# Patient Record
Sex: MALE | Race: WHITE | Employment: OTHER | URBAN - METROPOLITAN AREA
[De-identification: names, ages, dates, MRNs, and addresses within clinical notes are randomized per-mention and may not be internally consistent; named-entity substitution may affect disease eponyms.]

---

## 2019-08-12 NOTE — TELEPHONE ENCOUNTER
ONCOLOGY INTAKE: Records Information      APPT INFORMATION:  Referring provider:  Self([facility])  Referring provider s clinic:  NA  Reason for visit/diagnosis:  Non Hodgkin Lymphoma  Has patient been notified of appointment date and time?: Per PT    RECORDS INFORMATION:  Were the records received with the referral (via [employer])? No    Has patient been seen for any external appt for this diagnosis? Per PT, previous [facility], records in [facility].  PT also has a family practitioner at [facility]    ADDITIONAL INFORMATION:  NA

## 2019-08-13 NOTE — TELEPHONE ENCOUNTER
RECORDS STATUS - ALL OTHER DIAGNOSIS      RECORDS RECEIVED FROM:    DATE RECEIVED:    NOTES STATUS DETAILS   OFFICE NOTE from referring provider     OFFICE NOTE from medical oncologist     DISCHARGE SUMMARY from hospital     DISCHARGE REPORT from the ER     OPERATIVE REPORT     MEDICATION LIST     CLINICAL TRIAL TREATMENTS TO DATE     LABS     PATHOLOGY REPORTS [facility]/P 6/11/09, 5/21/09, 5/18/19 (Hematopathology-Path Consult from Rahel BENNETT 5/6/09), 5/14/19   ANYTHING RELATED TO DIAGNOSIS     GENONOMIC TESTING     TYPE:     IMAGING (NEED IMAGES & REPORT)     CT SCANS     MRI     MAMMO     ULTRASOUND     PET

## 2019-08-15 NOTE — TELEPHONE ENCOUNTER
Pt returned call. Pt confirmed no treatment since he was last seen by Dr. Barkley 5/12/16. Pt then stated that he would be cancelling the appointment as he had spoken with his GP and it was determined his symptoms were related to something else, and the appointment would not be needed.  3:33 PM

## 2019-09-05 ENCOUNTER — PRE VISIT (OUTPATIENT)
Dept: TRANSPLANT | Facility: CLINIC | Age: 79
End: 2019-09-05

## 2019-10-01 ENCOUNTER — HEALTH MAINTENANCE LETTER (OUTPATIENT)
Age: 79
End: 2019-10-01

## 2019-12-15 ENCOUNTER — HEALTH MAINTENANCE LETTER (OUTPATIENT)
Age: 79
End: 2019-12-15

## 2021-01-15 ENCOUNTER — HEALTH MAINTENANCE LETTER (OUTPATIENT)
Age: 81
End: 2021-01-15

## 2021-01-28 ENCOUNTER — IMMUNIZATION (OUTPATIENT)
Dept: NURSING | Facility: CLINIC | Age: 81
End: 2021-01-28
Payer: COMMERCIAL

## 2021-01-28 PROCEDURE — 0001A PR COVID VAC PFIZER DIL RECON 30 MCG/0.3 ML IM: CPT

## 2021-01-28 PROCEDURE — 91300 PR COVID VAC PFIZER DIL RECON 30 MCG/0.3 ML IM: CPT

## 2021-02-18 ENCOUNTER — IMMUNIZATION (OUTPATIENT)
Dept: NURSING | Facility: CLINIC | Age: 81
End: 2021-02-18
Payer: COMMERCIAL

## 2021-02-18 PROCEDURE — 91300 PR COVID VAC PFIZER DIL RECON 30 MCG/0.3 ML IM: CPT

## 2021-02-18 PROCEDURE — 0002A PR COVID VAC PFIZER DIL RECON 30 MCG/0.3 ML IM: CPT

## 2021-06-01 ENCOUNTER — OFFICE VISIT (OUTPATIENT)
Dept: SURGERY | Facility: CLINIC | Age: 81
End: 2021-06-01
Payer: COMMERCIAL

## 2021-06-01 VITALS
BODY MASS INDEX: 27.7 KG/M2 | HEIGHT: 73 IN | HEART RATE: 56 BPM | OXYGEN SATURATION: 99 % | WEIGHT: 209 LBS | DIASTOLIC BLOOD PRESSURE: 58 MMHG | SYSTOLIC BLOOD PRESSURE: 104 MMHG

## 2021-06-01 DIAGNOSIS — R10.31 BILATERAL GROIN PAIN: Primary | ICD-10-CM

## 2021-06-01 DIAGNOSIS — R10.32 BILATERAL GROIN PAIN: Primary | ICD-10-CM

## 2021-06-01 PROCEDURE — 99203 OFFICE O/P NEW LOW 30 MIN: CPT | Performed by: SURGERY

## 2021-06-01 RX ORDER — OMEPRAZOLE 10 MG/1
20 CAPSULE, DELAYED RELEASE ORAL DAILY
COMMUNITY

## 2021-06-01 RX ORDER — AMLODIPINE BESYLATE 5 MG/1
TABLET ORAL
COMMUNITY
Start: 2021-05-26

## 2021-06-01 RX ORDER — ATORVASTATIN CALCIUM 10 MG/1
TABLET, FILM COATED ORAL
COMMUNITY
Start: 2021-05-10

## 2021-06-01 ASSESSMENT — MIFFLIN-ST. JEOR: SCORE: 1716.9

## 2021-06-03 NOTE — PROGRESS NOTES
"[facility]  Surgery Consultation    HPI: Patient is a 81 year old male who is here for consultation requested by John Cohen [phone] for evaluation of bilateral inguinal pain. He has a history of bilateral open repair in the distant past.  Issues have been present for last 6 months. Pain is primarily located in the right groin and less but present in the left groin. Patient states pain is worse with heavy lifting and standing. Pain is rated as moderate and persisting. Likely occurred while doing PT where he was doing abdominal stretching. Symptoms are improved with rest. Hernia has not been incarcerated. Patient has not had any symptoms of bowel obstruction. Patient denies fevers, chills, nausea, vomiting, SOB, chest pain, abdominal pain..    PMH:   has a past medical history of GERD (gastroesophageal reflux disease), Hyperlipidemia, Hypertension, Lymphoma (H), Malignant neoplasm (H), and Toenail fungus.  PSH:    has a past surgical history that includes hernia repair and tonsillectomy.  Social History:   reports that he quit smoking about 58 years ago. His smoking use included cigarettes. He has a 2.50 pack-year smoking history. He has never used smokeless tobacco. He reports current alcohol use. He reports that he does not use drugs.  Family History:  family history includes Cancer in his mother; Colon Cancer in his mother; Diabetes in his mother; Other Cancer in his maternal grandfather; Respiratory in his father.  Medications/Allergies: Home medications and allergies reviewed.    ROS:  The 12 point Review of Systems is negative other than noted in the HPI.    Physical Exam:  /58 (BP Location: Left arm, Patient Position: Sitting, Cuff Size: Adult Regular)   Pulse 56   Ht 1.854 m (6' 1\")   Wt 94.8 kg (209 lb)   SpO2 99%   BMI 27.57 kg/m    GENERAL: Generally appears well.  Psych: Alert and Oriented.  Normal affect  Eyes: Sclera clear  Respiratory:  Lungs with good air " excursion  Cardiovascular:  Normal peripheral pulses  GI: Abdomen Soft Non-Tender entire abdomen No hernias palpated..  Groin- I examined the patient in both the standing and supine positions. Right Groin- no hernia palpated and no tenderness Left Groin- no hernia palpated and no tenderness No scrotal or testicle abnormalities. No pain with sit up or leg raise.  Lymphatic/Hematologic/Immune:  No cervical lymphadenopathy.  Integumentary:  No rashes  Neurological: grossly intact     All new lab and imaging data was reviewed.     Impression and Plan:  Patient is a 81 year old male with bilateral groin pain    PLAN:  I discussed the pathophysiology of groin pain. No hernias or other abnormalities are present on exam.  Onset of symptoms are around starting PT with abdominal strengtheningand stretching so I suspect it could be related to an underlying occult injury. I would recommend rest, ice, and antiinflammatory for 2 weeks. If he does not have improvement of symptoms with these measure, I advised him to call the office and will plan to schedule an MRI to rule out any other abnormalities. He is in agreement to plan.     Thank you very much for this consult.    Osmel Bo M.D.  [facility]  [phone]    Please route or send letter to:  Primary Care Provider (PCP) and Referring Provider

## 2021-07-27 ENCOUNTER — LAB REQUISITION (OUTPATIENT)
Dept: LAB | Facility: CLINIC | Age: 81
End: 2021-07-27
Payer: COMMERCIAL

## 2021-09-04 ENCOUNTER — HEALTH MAINTENANCE LETTER (OUTPATIENT)
Age: 81
End: 2021-09-04

## 2022-10-22 ENCOUNTER — HEALTH MAINTENANCE LETTER (OUTPATIENT)
Age: 82
End: 2022-10-22

## 2022-12-04 ENCOUNTER — HEALTH MAINTENANCE LETTER (OUTPATIENT)
Age: 82
End: 2022-12-04